# Patient Record
Sex: MALE | Race: AMERICAN INDIAN OR ALASKA NATIVE
[De-identification: names, ages, dates, MRNs, and addresses within clinical notes are randomized per-mention and may not be internally consistent; named-entity substitution may affect disease eponyms.]

---

## 2017-06-05 ENCOUNTER — HOSPITAL ENCOUNTER (OUTPATIENT)
Dept: HOSPITAL 5 - OPU | Age: 42
Discharge: HOME | End: 2017-06-05
Attending: RADIOLOGY
Payer: MEDICARE

## 2017-06-05 VITALS — SYSTOLIC BLOOD PRESSURE: 142 MMHG | DIASTOLIC BLOOD PRESSURE: 94 MMHG

## 2017-06-05 DIAGNOSIS — I12.0: ICD-10-CM

## 2017-06-05 DIAGNOSIS — N18.6: ICD-10-CM

## 2017-06-05 DIAGNOSIS — Z99.2: ICD-10-CM

## 2017-06-05 DIAGNOSIS — Z98.890: ICD-10-CM

## 2017-06-05 DIAGNOSIS — Z88.1: ICD-10-CM

## 2017-06-05 DIAGNOSIS — Z79.899: ICD-10-CM
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
## 2017-06-05 DIAGNOSIS — T82.590A: Primary | ICD-10-CM

## 2017-06-05 PROCEDURE — C1894 INTRO/SHEATH, NON-LASER: HCPCS

## 2017-06-05 PROCEDURE — C1769 GUIDE WIRE: HCPCS

## 2017-06-05 PROCEDURE — 36901 INTRO CATH DIALYSIS CIRCUIT: CPT

## 2017-06-05 PROCEDURE — 36415 COLL VENOUS BLD VENIPUNCTURE: CPT

## 2017-06-05 PROCEDURE — 84132 ASSAY OF SERUM POTASSIUM: CPT

## 2017-06-05 PROCEDURE — C1751 CATH, INF, PER/CENT/MIDLINE: HCPCS

## 2017-06-05 NOTE — OPERATIVE REPORT
Operative Report


Operative Report: 





EXAM: LEFT UPPER EXTREMITY FISTULOGRAM





CLINICAL INDICATION: LEFT ARM AND FACIAL SWELLING





DATE: 6/5/2017





PROCEDURE: Following an excellent addition of the risks, benefits and 

alternatives; written informed consent was obtained.  The patient was brought 

to the injury graphic suite and placed in supine position on the examination 

table.  A palpable thrill is present in the patient's left upper extremity 

fistula.  There is diffuse swelling of the left upper extremity.  Patient's 

left arm and fistula were prepped and draped in the usual sterile fashion.  1% 

lidocaine was used for anesthesia.





A 7 cm 21-gauge needle was advanced to the fistula directed towards the venous 

outflow.  A 0.018 guidewire was advanced through the needle and the needle 

removed and exchanged for a micro-sheath.  The 0.018 guidewire was exchanged 

for a 0.035 guidewire and the micro-sheath exchanged for a 6 Macedonian vascular 

sheath.





Venography performed to the sheath demonstrates an enlarged fistula an area the 

cannulation site with the presence of extensive collateral veins in the upper 

arm.  There is complete occlusion of the subclavian vein with only collateral 

flow distally.  No filling of the right cephalic vein or SVC is identified from 

the left side even on delayed imaging.





A 4 Macedonian vertebral catheter was then advanced over the guidewire to the area 

of the occlusion.  Multiple attempts were made to cannulate the occluded 

subclavian vein.  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 

brachiocephalic vein.  Ultimately, the subclavian vein remains occluded and the 

catheters, guidewires and she's were removed and hemostasis achieved using 4-0 

Vicryl suture and manual compression.  A sterile dressing was then applied.





The patient tolerated the procedure well.  There were no immediate post 

procedure competitions.





Conscious sedation was performed under the guidance of radiologic nursing.  

Continuous cardiopulmonary monitoring was utilized.





IMPRESSION: 1) Left upper extremity fistulogram demonstrating complete 

occlusion of the left subclavian vein.  The patient has known occlusion of the 

left brachial cephalic vein and this appears to be an extension of that 

process.  Patient has poorly developed collaterals leading to left upper 

extremity swelling.  He will need further evaluation for possible fistula 

ligation in fistula creation in the right arm.

## 2017-06-05 NOTE — SHORT STAY SUMMARY
Short Stay Documentation


Date of service: 06/05/17





- History


Principal diagnosis: LUE swelling


H&P: obtained from office





- Allergies and Medications


Current Medications: 


 Allergies





cefazolin sodium [From [facility]] Allergy (Verified 03/31/17 08:06)


 Vomiting





 Home Medications











 Medication  Instructions  Recorded  Confirmed  Last Taken  Type


 


Calcium Acetate 667 mg PO TID 02/20/14 03/31/17 1 Day Ago History








Active Medications





Sodium Chloride (Nacl 0.9% 1000 Ml)  1,000 mls @ 42 mls/hr IV AS DIRECT MYRNA











- Brief post op/procedure progress note


Date of procedure: 06/05/17


Pre-op diagnosis: malfunctioning dialysis access


Post-op diagnosis: same


Procedure: 





LUE FGA


Anesthesia: local


Surgeon: CATHY NEAL


Estimated blood loss: minimal


Pathology: none


Condition: stable





- Disposition


Condition at discharge: Good


Disposition: DISCHARGED TO HOME OR SELFCARE





Short Stay Discharge Plan


Activity: advance as tolerated


Weight Bearing Status: Weight Bear as Tolerated


Diet: renal


Wound: keep clean and dry, per your surgeon's advice


Follow up with: 


URIEL MURPHY MD [Primary Care Provider] - 7 Days


REGIS GIBBONS MD [Staff Physician] - 7 Days

## 2017-07-20 ENCOUNTER — HOSPITAL ENCOUNTER (OUTPATIENT)
Dept: HOSPITAL 5 - OR | Age: 42
Discharge: HOME | End: 2017-07-20
Attending: SURGERY
Payer: MEDICARE

## 2017-07-20 VITALS — DIASTOLIC BLOOD PRESSURE: 74 MMHG | SYSTOLIC BLOOD PRESSURE: 156 MMHG

## 2017-07-20 DIAGNOSIS — Z88.1: ICD-10-CM

## 2017-07-20 DIAGNOSIS — N18.6: ICD-10-CM

## 2017-07-20 DIAGNOSIS — Z99.2: ICD-10-CM

## 2017-07-20 DIAGNOSIS — I12.0: Primary | ICD-10-CM

## 2017-07-20 DIAGNOSIS — Z98.890: ICD-10-CM

## 2017-07-20 DIAGNOSIS — D64.9: ICD-10-CM

## 2017-07-20 DIAGNOSIS — Z87.891: ICD-10-CM
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
## 2017-07-20 DIAGNOSIS — Z79.899: ICD-10-CM

## 2017-07-20 LAB
ANION GAP SERPL CALC-SCNC: 21 MMOL/L
BLASTOCYTES % (MANUAL): 0 %
BUN SERPL-MCNC: 55 MG/DL (ref 9–20)
BUN/CREAT SERPL: 3.84 %
CALCIUM SERPL-MCNC: 8 MG/DL (ref 8.4–10.2)
CHLORIDE SERPL-SCNC: 96.9 MMOL/L (ref 98–107)
CO2 SERPL-SCNC: 26 MMOL/L (ref 22–30)
GLUCOSE SERPL-MCNC: 75 MG/DL (ref 75–100)
HCT VFR BLD CALC: 35.2 % (ref 35.5–45.6)
HGB BLD-MCNC: 11.1 GM/DL (ref 11.8–15.2)
MCH RBC QN AUTO: 28 PG (ref 28–32)
MCHC RBC AUTO-ENTMCNC: 32 % (ref 32–34)
MCV RBC AUTO: 89 FL (ref 84–94)
PLATELET # BLD: 135 K/MM3 (ref 140–440)
POIKILOCYTOSIS BLD QL SMEAR: (no result)
POTASSIUM SERPL-SCNC: 5.3 MMOL/L (ref 3.6–5)
RBC # BLD AUTO: 3.96 M/MM3 (ref 3.65–5.03)
SODIUM SERPL-SCNC: 139 MMOL/L (ref 137–145)
TOTAL CELLS COUNTED PERCENT: 11
WBC # BLD AUTO: 2.6 K/MM3 (ref 4.5–11)

## 2017-07-20 PROCEDURE — 36415 COLL VENOUS BLD VENIPUNCTURE: CPT

## 2017-07-20 PROCEDURE — 85025 COMPLETE CBC W/AUTO DIFF WBC: CPT

## 2017-07-20 PROCEDURE — 80048 BASIC METABOLIC PNL TOTAL CA: CPT

## 2017-07-20 PROCEDURE — 36819 AV FUSE UPPR ARM BASILIC: CPT

## 2017-07-20 PROCEDURE — A4649 SURGICAL SUPPLIES: HCPCS

## 2017-07-20 PROCEDURE — 85007 BL SMEAR W/DIFF WBC COUNT: CPT

## 2017-07-20 PROCEDURE — C1757 CATH, THROMBECTOMY/EMBOLECT: HCPCS

## 2017-07-20 NOTE — SHORT STAY SUMMARY
Short Stay Documentation


Date of service: 07/20/17





- History


H&P: obtained from office





- Allergies and Medications


Current Medications: 


 Allergies





cefazolin sodium [From [facility]] Allergy (Verified 03/31/17 08:06)


 Vomiting





 Home Medications











 Medication  Instructions  Recorded  Confirmed  Last Taken  Type


 


Calcium Acetate 667 mg PO TID 02/20/14 07/20/17 07/19/17 History


 


HYDROcodone/APAP 5-325 [[place] 1 each PO Q4HR PRN #40 tablet 07/20/17  Unknown Rx





5/325]     








Active Medications





Famotidine (Pepcid)  20 mg PO PREOP NR


   Stop: 07/20/17 15:00


   Last Admin: 07/20/17 09:53 Dose:  20 mg


Hydromorphone HCl (Dilaudid)  0.5 mg IV Q10MIN PRN


   PRN Reason: Pain , Severe (7-10)


   Stop: 07/20/17 15:00


Sodium Chloride (Nacl 0.9% 1000 Ml)  1,000 mls @ 42 mls/hr IV AS DIRECT MYRNA


   Last Admin: 07/20/17 09:45 Dose:  42 mls/hr


Vancomycin HCl (Vancomycin/Ns 1 Gm/250 Ml)  1 gm in 250 mls @ 166.667 mls/hr IV 

PREOP NR


   PRN Reason: Protocol


   Stop: 07/20/17 23:59


   Last Admin: 07/20/17 09:55 Dose:  166.667 mls/hr


Midazolam HCl (Versed)  2 mg IV PREOP NR


   Stop: 07/20/17 23:59


   Last Admin: 07/20/17 09:53 Dose:  2 mg











- Brief post op/procedure progress note


Procedure: 








Operative Report


Operative Report: 





Date of procedure: 07/20/2017





Pre-operative diagnosis: End-stage renal failure





Post-operative diagnosis: Same





Procedure name(s): Basalic vein to brachial artery fistula at the level of the 

elbow in the right upper extremity by forearm transposition





Surgeon: Willy Santos MD





Assistant: [EWA Narayanan]





Anesthesia: Local MAC





EBL: Less than 50 mL





Operative indication: Patient is a 41 year old male with a history of end-stage 

renal failure.  The patient presents for establishment of long-term 

hemodialysis access.





Findings: Excellent thrill in the arteriovenous fistula at the end of the 

procedure.  Easily palpable radial pulse distal to the anastomosis.








- Disposition


Condition at discharge: Good


Disposition: DC-01 TO HOME OR SELFCARE





- Discharge Diagnoses


(1) Malfunction of arteriovenous dialysis fistula


Status: Acute   


Qualifiers: 


   Encounter type: E 





(2) ESRD on hemodialysis


Status: Acute   





Short Stay Discharge Plan


Activity: advance as tolerated


Weight Bearing Status: Weight Bear as Tolerated


Diet: renal


Wound: keep clean and dry


Follow up with: 


WILLY SANTOS MD [Staff Physician] - 7 Days


Prescriptions: 


HYDROcodone/APAP 5-325 [Manderson 5/325] 1 each PO Q4HR PRN #40 tablet


 PRN Reason: Pain

## 2017-07-20 NOTE — OPERATIVE REPORT
Operative Report


Operative Report: 





Date of procedure: 07/20/2017





Pre-operative diagnosis: End-stage renal failure





Post-operative diagnosis: Same





Procedure name(s): Basalic vein to brachial artery fistula at the level of the 

elbow in the right upper extremity by forearm transposition





Surgeon: Abelardo Davila MD





Assistant: [EWA Narayanan]





Anesthesia: Local MAC





EBL: Less than 50 mL





Operative indication: Patient is a 41 year old male with a history of end-stage 

renal failure.  The patient presents for establishment of long-term 

hemodialysis access.





Findings: Excellent thrill in the arteriovenous fistula at the end of the 

procedure.  Easily palpable radial pulse distal to the anastomosis.





Procedure: 





The patient was placed on the table in the supine position.  The right arm was 

prepped with ChloraPrep solution and draped in the usual sterile fashion.  The 

ultrasound was used to identify the basilic vein after a tourniquet had been 

placed in the upper arm using a rubber glove.  The vein appeared to be of 

adequate caliber and no thrombus was noted.  The cephalic vein at the wrist 

contained chronic thrombus.  The cephalic vein in the upper arm had been 

previously used for a fistula and there appeared to be a stent in the vein on 

ultrasound.  The vein was completely occluded.  Overall, it appeared that the 

upper arm basilic vein was the best vein to use.





Under local anesthesia, an incision was made across the elbow medially along 

the course of the basilic vein.  Dissection was carried out to identify the 

basilic vein.  A small, separate incision was made in the antecubital fossa 

just above the previous incision site to identify the brachial artery.  This 

was surrounded with Vesseloops proximally and distally just proximal to the 

previous vein anastomosis.. The vein was dissected from the surrounding tissue 

to provide enough length to reach the artery.  A small subcutaneous tunnel was 

then made between the 2 incisions.  The vein was divided distally and then 

spatulated to create a T-type anastomosis with the side of the artery.  An 

arteriotomy was made which was about 5 mm in length.  70 proline was used to 

anastomose the vein to the artery.  All vessels were flushed and vented to 

remove any air or debris.  There was excellent arterial inflow.  The 

anastomosis was completed and flow started into the fistula with the 

development of a thrill along the distal third of the upper arm.  There was 

some angulation of the vein near the anastomosis due to the tendon in the area 

so I incised this partially to relax the transition into the deeper part of the 

arm where the artery was.  The thrill improved even more after that.





Hemostasis was obtained.  The wounds were infiltrated with half percent 

Marcaine and epinephrine.  Closure was done with 3-0 Vicryl and 4-0 

subcuticular PDS.  Dermabond was placed. 





The patient tolerated the procedure well.  There was an easily palpable right 

radial pulse at the end of the procedure.  There was a good thrill in the 

fistula at the end of the procedure.  Sponge, needle, and instrument counts 

were reported as correct.

## 2017-07-20 NOTE — ANESTHESIA CONSULTATION
Anesthesia Consult and Med Hx


Date of service: 07/27/17





- Airway


Anesthetic Teeth Evaluation: Good


ROM Head & Neck: Adequate


Mental/Hyoid Distance: Adequate


Mallampati Class: Class II


Intubation Access Assessment: Probably Good





- Pulmonary Exam


CTA: Yes





- Cardiac Exam


Cardiac Exam: RRR





- Pre-Operative Health Status


ASA Pre-Surgery Classification: ASA3


Proposed Anesthetic Plan: General





- Pulmonary


Hx Smoking: Yes (QUIT 2 MONTHS AGO, SMOKED 1/2 PPD X 6 YRS)


Hx Asthma: No


COPD: No


Hx Pneumonia: No


Hx Sleep Apnea: No





- Cardiovascular System


Hx Hypertension: No





- Central Nervous System


Hx Seizures: No


CVA: No


Hx Psychiatric Problems: No





- Endocrine


Hx Renal Disease: Yes (dialysis M-W-F, had HD yesterday)


Hx End Stage Renal Disease: Yes (2001 started dialysis - LUE fistula 

functioning but arm swollen)


Hx Cirrhosis: No


Hx Insulin Dependent Diabetes: No


Hx Non-Insulin Dependent Diabetes: No


Hx Thyroid Disease: No (s/p parathyroidectomy)





- Hematic


Hx Anemia: Yes





- Other Systems


Hx Alcohol Use: No


Hx Cancer: No


Hx Obesity: No

## 2017-10-12 ENCOUNTER — HOSPITAL ENCOUNTER (OUTPATIENT)
Dept: HOSPITAL 5 - OR | Age: 42
Discharge: HOME | End: 2017-10-12
Attending: SURGERY
Payer: MEDICARE

## 2017-10-12 VITALS — DIASTOLIC BLOOD PRESSURE: 60 MMHG | SYSTOLIC BLOOD PRESSURE: 168 MMHG

## 2017-10-12 DIAGNOSIS — T82.898A: Primary | ICD-10-CM

## 2017-10-12 DIAGNOSIS — I12.0: ICD-10-CM

## 2017-10-12 DIAGNOSIS — Z79.01: ICD-10-CM

## 2017-10-12 DIAGNOSIS — N18.6: ICD-10-CM
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
## 2017-10-12 DIAGNOSIS — Z87.891: ICD-10-CM

## 2017-10-12 DIAGNOSIS — Z99.2: ICD-10-CM

## 2017-10-12 DIAGNOSIS — E89.0: ICD-10-CM

## 2017-10-12 LAB
ANION GAP SERPL CALC-SCNC: 22 MMOL/L
BASOPHILS NFR BLD AUTO: 0.8 % (ref 0–1.8)
BUN SERPL-MCNC: 40 MG/DL (ref 9–20)
BUN/CREAT SERPL: 3 %
CALCIUM SERPL-MCNC: 8.4 MG/DL (ref 8.4–10.2)
CHLORIDE SERPL-SCNC: 93.9 MMOL/L (ref 98–107)
CO2 SERPL-SCNC: 27 MMOL/L (ref 22–30)
EOSINOPHIL NFR BLD AUTO: 1.1 % (ref 0–4.3)
GLUCOSE SERPL-MCNC: 76 MG/DL (ref 75–100)
HCT VFR BLD CALC: 32.6 % (ref 35.5–45.6)
HGB BLD-MCNC: 10.4 GM/DL (ref 11.8–15.2)
INR PPP: 1.07 (ref 0.87–1.13)
MCH RBC QN AUTO: 28 PG (ref 28–32)
MCHC RBC AUTO-ENTMCNC: 32 % (ref 32–34)
MCV RBC AUTO: 87 FL (ref 84–94)
PLATELET # BLD: 160 K/MM3 (ref 140–440)
POTASSIUM SERPL-SCNC: 4.4 MMOL/L (ref 3.6–5)
RBC # BLD AUTO: 3.75 M/MM3 (ref 3.65–5.03)
SODIUM SERPL-SCNC: 138 MMOL/L (ref 137–145)
WBC # BLD AUTO: 3.4 K/MM3 (ref 4.5–11)

## 2017-10-12 PROCEDURE — 80048 BASIC METABOLIC PNL TOTAL CA: CPT

## 2017-10-12 PROCEDURE — 85610 PROTHROMBIN TIME: CPT

## 2017-10-12 PROCEDURE — A4649 SURGICAL SUPPLIES: HCPCS

## 2017-10-12 PROCEDURE — 85025 COMPLETE CBC W/AUTO DIFF WBC: CPT

## 2017-10-12 PROCEDURE — C9250 ARTISS FIBRIN SEALANT: HCPCS

## 2017-10-12 PROCEDURE — 36415 COLL VENOUS BLD VENIPUNCTURE: CPT

## 2017-10-12 PROCEDURE — 36819 AV FUSE UPPR ARM BASILIC: CPT

## 2017-10-12 RX ADMIN — HYDROMORPHONE HYDROCHLORIDE PRN MG: 1 INJECTION, SOLUTION INTRAMUSCULAR; INTRAVENOUS; SUBCUTANEOUS at 11:40

## 2017-10-12 RX ADMIN — HYDROMORPHONE HYDROCHLORIDE PRN MG: 1 INJECTION, SOLUTION INTRAMUSCULAR; INTRAVENOUS; SUBCUTANEOUS at 11:50

## 2017-10-12 NOTE — SHORT STAY SUMMARY
Short Stay Documentation





- History


H&P: obtained from office





- Allergies and Medications


Current Medications: 


 Allergies





cefazolin sodium [From [facility]] Allergy (Verified 10/10/17 15:58)


 Vomiting





 Home Medications











 Medication  Instructions  Recorded  Confirmed  Last Taken  Type


 


Calcium Acetate 667 mg PO TID 02/20/14 10/12/17 10/11/17 History








Active Medications





Sodium Chloride (Nacl 0.9% 1000 Ml)  1,000 mls @ 42 mls/hr IV AS DIRECT MYRNA


   Last Admin: 10/12/17 07:20 Dose:  42 mls/hr


Vancomycin HCl (Vancomycin/Ns 1 Gm/250 Ml)  1 gm in 250 mls @ 166.667 mls/hr IV 

PREOP NR


   PRN Reason: Protocol


   Stop: 10/12/17 23:59


   Last Admin: 10/12/17 07:30 Dose:  166.667 mls/hr











- Brief post op/procedure progress note


Date of procedure: 10/12/17


Pre-op diagnosis: FAILING RUE AVF


Post-op diagnosis: same


Procedure: 





ELEVATION OF RIGHT UPPER ARM BASILIC VEIN FISTULA


Anesthesia: other (LMA, MARCAINE LOCAL)


Findings: 





GOOD THRILL IN AVF AT THE END OF THE PROCEDURE


Surgeon: WILLY SANTOS


Estimated blood loss: 50-100ml


Pathology: none


Condition: stable





- Hospital course


Hospital course: 





[facility]





- Disposition


Condition at discharge: Good


Disposition: DC-01 TO HOME OR SELFCARE





Short Stay Discharge Plan


Activity: advance as tolerated


Diet: advance as tolerated


Wound: per your surgeon's advice


Follow up with: 


WILLY SANTOS MD [Staff Physician] - 7 Days


Prescriptions: 


HYDROcodone/APAP 5-325 [[place] 5/325] 1 each PO Q6HR PRN #40 tablet


 PRN Reason: Pain

## 2017-10-12 NOTE — ANESTHESIA CONSULTATION
Anesthesia Consult and Med Hx


Date of service: 10/12/17





- Airway


Anesthetic Teeth Evaluation: Good


ROM Head & Neck: Adequate


Mental/Hyoid Distance: Adequate


Mallampati Class: Class II


Intubation Access Assessment: Probably Good





- Pulmonary Exam


CTA: Yes





- Cardiac Exam


Cardiac Exam: RRR





- Pre-Operative Health Status


ASA Pre-Surgery Classification: ASA4


Proposed Anesthetic Plan: General





- Pulmonary


Hx Smoking: Yes (former)


Hx Asthma: No


COPD: No


Hx Pneumonia: No


Hx Sleep Apnea: No





- Cardiovascular System


Hx Hypertension: Yes (per pt resolved)





- Central Nervous System


Hx Psychiatric Problems: No





- Endocrine


Hx Renal Disease: Yes (dialysis M-W-F, had HD yesterday)


Hx End Stage Renal Disease: Yes


Hx Cirrhosis: No


Hx Insulin Dependent Diabetes: No


Hx Non-Insulin Dependent Diabetes: No


Hx Thyroid Disease: No (s/p parathyroidectomy)





- Hematic


Hx Anemia: Yes





- Other Systems


Hx Cancer: No


Hx Obesity: No

## 2017-10-12 NOTE — OPERATIVE REPORT
Operative Report


Operative Report: 








Date of procedure: 11/12/2017





Pre-operative diagnosis: Failing arteriovenous fistula right upper extremity





Post-operative diagnosis: Same





Procedure name(s): Creation of right upper extremity arteriovenous fistula by 

upper arm basilic vein transposition





Surgeon: Abelardo Davila MD





Assistant: None





Anesthesia: Gen. with local supplementation using half percent Marcaine plain 

with epinephrine





EBL: Less than 150 mL





Operative indication: Patient is a 41-year-old man with end-stage renal 

failure.  There is a maturing basilic vein fistula in the right upper 

extremity.  This requires elevation for use.





Findings:  Excellent thrill in the fistula the end of the procedure.  Easily 

palpable right radial pulse at the end of the procedure.





Procedure: 


The patient was placed on the table in the supine position.  General anesthesia 

was given.  The area over the right arm was prepped with ChloraPrep solution 

and draped in the usual sterile fashion.  Ultrasound guidance was used to 

identify the location of the basilic vein.  The area around the incision site 

was infiltrated with half percent Marcaine with epinephrine.  A linear incision 

was made along the medial portion of the upper arm over the basilic vein.  

Dissection was carried out to identify the vein.  All nerve structures were 

spared.  There was dense scar tissue around the fistula at the area of the 

elbow which was quite difficult to dissected.  All visible nerve structures 

were spared in this area including a nerve structure which was scarred heavily 

to the body of the fistula.  All branches of the fistula were taken down with 

clips and 3-0 silk ties.  A subcutaneous tunnel was made on the anterior 

surface of the upper arm.  The tunnel was infiltrated with half percent 

Marcaine with epinephrine. Patient was heparinized with 2000 units of 

intravenous heparin.  The vein was occluded near the arterial anastomosis.  The 

vein was divided and hydrodilated with heparinized saline.  The vein was then 

tunneled through the subcutaneous tunnel and redilated with heparinized saline 

to prevent any kinks or twisting in the fistula.  An end to end anastomosis was 

created in a beveled fashion between the 2 ends of the vein at the arterial 

side of the fistula using 60 proline.  The fistula was flushed and vented to 

remove any air or debris and then flow was started into the fistula with the 

development of an immediate and excellent thrill over the entire body of the 

fistula in the upper arm.  Meticulous hemostasis was obtained.  Closure was 

done with 3-0 Vicryl on the subcutaneous tissue.  4-0 subcuticular PDS was used 

to close the skin.  Sterile dressings were applied. Sponge, needle, and 

instrument counts were reported as correct.  The patient tolerated the 

procedure well and left the operating room with an easily palpable right radial 

pulse and an easily palpable thrill in the arteriovenous fistula.

## 2018-05-10 ENCOUNTER — HOSPITAL ENCOUNTER (OUTPATIENT)
Dept: HOSPITAL 5 - OR | Age: 43
Discharge: HOME | End: 2018-05-10
Attending: SURGERY
Payer: MEDICARE

## 2018-05-10 VITALS — DIASTOLIC BLOOD PRESSURE: 78 MMHG | SYSTOLIC BLOOD PRESSURE: 151 MMHG

## 2018-05-10 DIAGNOSIS — I12.0: ICD-10-CM
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
## 2018-05-10 DIAGNOSIS — Z87.891: ICD-10-CM

## 2018-05-10 DIAGNOSIS — Z98.890: ICD-10-CM

## 2018-05-10 DIAGNOSIS — T82.898A: Primary | ICD-10-CM

## 2018-05-10 DIAGNOSIS — I87.309: ICD-10-CM

## 2018-05-10 DIAGNOSIS — N18.6: ICD-10-CM

## 2018-05-10 DIAGNOSIS — Z88.1: ICD-10-CM

## 2018-05-10 LAB
ANISOCYTOSIS BLD QL SMEAR: (no result)
BAND NEUTROPHILS # (MANUAL): 0 K/MM3
BUN SERPL-MCNC: 37 MG/DL (ref 9–20)
BUN/CREAT SERPL: 4 %
CALCIUM SERPL-MCNC: 7.8 MG/DL (ref 8.4–10.2)
HCT VFR BLD CALC: 32.3 % (ref 35.5–45.6)
HEMOLYSIS INDEX: 2
HGB BLD-MCNC: 10.5 GM/DL (ref 11.8–15.2)
MCH RBC QN AUTO: 28 PG (ref 28–32)
MCHC RBC AUTO-ENTMCNC: 33 % (ref 32–34)
MCV RBC AUTO: 87 FL (ref 84–94)
MYELOCYTES # (MANUAL): 0 K/MM3
PLATELET # BLD: 148 K/MM3 (ref 140–440)
PROMYELOCYTES # (MANUAL): 0 K/MM3
RBC # BLD AUTO: 3.7 M/MM3 (ref 3.65–5.03)
TOTAL CELLS COUNTED BLD: 100

## 2018-05-10 PROCEDURE — 36832 AV FISTULA REVISION OPEN: CPT

## 2018-05-10 PROCEDURE — 80048 BASIC METABOLIC PNL TOTAL CA: CPT

## 2018-05-10 PROCEDURE — 36415 COLL VENOUS BLD VENIPUNCTURE: CPT

## 2018-05-10 PROCEDURE — 85025 COMPLETE CBC W/AUTO DIFF WBC: CPT

## 2018-05-10 PROCEDURE — 85007 BL SMEAR W/DIFF WBC COUNT: CPT

## 2018-05-10 PROCEDURE — 88304 TISSUE EXAM BY PATHOLOGIST: CPT

## 2018-05-10 PROCEDURE — 88311 DECALCIFY TISSUE: CPT

## 2018-05-10 NOTE — ANESTHESIA CONSULTATION
Anesthesia Consult and Med Hx





- Airway


Anesthetic Teeth Evaluation: Good


ROM Head & Neck: Adequate


Mental/Hyoid Distance: Adequate


Mallampati Class: Class II


Intubation Access Assessment: Good





- Pulmonary Exam


CTA: Yes





- Cardiac Exam


Cardiac Exam: RRR





- Pre-Operative Health Status


ASA Pre-Surgery Classification: ASA3


Proposed Anesthetic Plan: General





- Pulmonary


Hx Smoking: Yes (former)





- Cardiovascular System


Hx Hypertension: Yes (per pt resolved)





- Central Nervous System


Hx Psychiatric Problems: No





- Endocrine


Hx Renal Disease: Yes


Hx End Stage Renal Disease: Yes


Hx Insulin Dependent Diabetes: No


Hx Non-Insulin Dependent Diabetes: No


Hx Thyroid Disease: No (s/p parathyroidectomy)





- Hematic


Hx Anemia: Yes





- Other Systems


Hx Cancer: No

## 2018-05-10 NOTE — OPERATIVE REPORT
Operative Report


Operative Report: 





Date of procedure: 05/10/2018





Pre-operative diagnosis: Complications of hemodialysis access left arm (

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 

stenosis) 





Post-operative diagnosis: Same





Procedure name(s): Excision of ulcerated AV fistula left arm, repair of 

brachial artery with vein





Surgeon: Zachariah Sadler MD





Assistant: None





Anesthesia: Gen.





EBL: 150 mL





Specimen(s): Ulcerated fistula and skin





Complications: None





Findings: Enlarged ulcerated AV fistula successfully excised and flow in 

brachial artery distal to the arterial repair





Procedure: Patient in the supine position with the left arm extended the entire 

extremity was prepped and draped using standard sterile technique.  A 

longitudinal incision was made beginning on the venous side of the 

pseudoaneurysm and extending distally along the fistula to the arterial 

anastomosis at the antecubital fossa.  The skin overlying the fistula was 

excised in elliptical fashion keeping it attached to the fistula.  Dissection 

was then carried deeply until the anterior surface of the fistula was 

identified throughout the entire length and using a combination of sharp and 

blunt dissection the fistula was  from the surrounding soft tissue in 

the arm.  It was a modest amount of venous bleeding secondary to chronic venous 

hypertension.  This is controlled using a variety of techniques including 

suture ligature vessel ties and electrocautery.  Once the fistula was 

sufficiently mobilized in the upper arm I then continued distally until the 

anastomosis was identified and a centimeter or 2 of brachial artery on either 

side of the fistula was dissected from the scar tissue and encircled using 

vessel loops.  The artery was then occluded and the fistula was then divided 

and controlled with vascular clamps.  I left a small amount of of the fistula 

over the opening and then sewed this down as a patch using 2-0 Prolene suture 

in running technique.  Flow was then released back to the hand with the 

development of an excellent radial artery Doppler signal which was Asst. with 

his preoperative evaluation.  Stasis was good.  Fistula was then ligated 

proximally in the arm and then excised and sent for pathological assessment.  

Hemostasis was obtained using electrocautery.  The patient was also given DDAVP 

venously by anesthesia.  The incision was blocked with field block of Marcaine 

0.5% therapy and closed using 3-0 Vicryl subcutaneous.  The skin was 

reapproximated with 0 Monocryl subcuticular and the incision was sealed with 

Dermabond.  A fluff dressing was applied along with Ace wrap light compression.

  Patient was then returned to the supine position extubated and returned to 

the recovery room in stable condition having tolerated the procedure well.  

Sponge and needle counts were correct.

## 2018-05-10 NOTE — SHORT STAY SUMMARY
Short Stay Documentation


Narrative H&P: 





Admitted to the operating suite for outpatient removal of an AV fistula and 

repair of brachial artery





- History


H&P: obtained from office





- Allergies and Medications


Current Medications: 


 Allergies





cefazolin sodium [From [facility]] Allergy (Verified 05/09/18 13:43)


 Vomiting





 Home Medications











 Medication  Instructions  Recorded  Confirmed  Last Taken  Type


 


Calcium Acetate 667 mg PO TID 02/20/14 05/10/18 05/09/18 22:00 History


 


HYDROcodone/APAP 5-325 [[place] 1 each PO Q6HR PRN #40 tablet 10/12/17 05/09/18 

Unknown Rx





5/325]     








Active Medications





Hydromorphone HCl (Dilaudid)  0.5 mg IV Q10MIN PRN


   PRN Reason: Pain , Severe (7-10)


   Stop: 05/10/18 13:00


Sodium Chloride (Nacl 0.9% 1000 Ml)  1,000 mls @ 42 mls/hr IV AS DIRECT MYRNA


   Last Admin: 05/10/18 07:53 Dose:  42 mls/hr


Vancomycin HCl (Vancomycin/Ns 1 Gm/250 Ml)  1 gm in 250 mls @ 166.667 mls/hr IV 

PREOP NR; Protocol


   Stop: 05/10/18 23:59


   Last Admin: 05/10/18 08:12 Dose:  166.667 mls/hr











- Brief post op/procedure progress note


Date of procedure: 05/10/18


Procedure: 





Pre-operative diagnosis: Complications of hemodialysis access left arm (

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 

stenosis) 





Post-operative diagnosis: Same





Procedure name(s): Excision of ulcerated AV fistula left arm, repair of 

brachial artery with vein





Surgeon: Zachariah Sadler MD





Assistant: None





Anesthesia: Gen.





EBL: 150 mL





Specimen(s): Ulcerated fistula and skin





Complications: None





Findings: Enlarged ulcerated AV fistula successfully excised and flow in 

brachial artery distal to the arterial repair





Procedure: Patient in the supine position with the left arm extended the entire 

extremity was prepped and draped using standard sterile technique.  A 

longitudinal incision was made beginning on the venous side of the 

pseudoaneurysm and extending distally along the fistula to the arterial 

anastomosis at the antecubital fossa.  The skin overlying the fistula was 

excised in elliptical fashion keeping it attached to the fistula.  Dissection 

was then carried deeply until the anterior surface of the fistula was 

identified throughout the entire length and using a combination of sharp and 

blunt dissection the fistula was  from the surrounding soft tissue in 

the arm.  It was a modest amount of venous bleeding secondary to chronic venous 

hypertension.  This is controlled using a variety of techniques including 

suture ligature vessel ties and electrocautery.  Once the fistula was 

sufficiently mobilized in the upper arm I then continued distally until the 

anastomosis was identified and a centimeter or 2 of brachial artery on either 

side of the fistula was dissected from the scar tissue and encircled using 

vessel loops.  The artery was then occluded and the fistula was then divided 

and controlled with vascular clamps.  I left a small amount of of the fistula 

over the opening and then sewed this down as a patch using 2-0 Prolene suture 

in running technique.  Flow was then released back to the hand with the 

development of an excellent radial artery Doppler signal which was Asst. with 

his preoperative evaluation.  Stasis was good.  Fistula was then ligated 

proximally in the arm and then excised and sent for pathological assessment.  

Hemostasis was obtained using electrocautery.  The patient was also given DDAVP 

venously by anesthesia.  The incision was blocked with field block of Marcaine 

0.5% therapy and closed using 3-0 Vicryl subcutaneous.  The skin was 

reapproximated with 0 Monocryl subcuticular and the incision was sealed with 

Dermabond.  A fluff dressing was applied along with Ace wrap light compression.

  Patient was then returned to the supine position extubated and returned to 

the recovery room in stable condition having tolerated the procedure well.  

Sponge and needle counts were correct.





- Disposition


Condition at discharge: Stable


Disposition: DC-01 TO HOME OR SELFCARE





- Discharge Diagnoses


(1) ESRD on hemodialysis


Status: Chronic   





(2) Malfunction of arteriovenous dialysis fistula


Status: Chronic   


Qualifiers: 


   Encounter type: subsequent encounter   Qualified Code(s): T82.590D - Other 

mechanical complication of surgically created arteriovenous fistula, subsequent 

encounter   





(3) Superior vena cava syndrome


Status: Chronic   





Short Stay Discharge Plan


Activity: advance as tolerated


Diet: renal


Wound: keep clean and dry


Special Instructions: no heavy lifting


Follow up with: 


URIEL MURPHY MD [Primary Care Provider] - 7 Days


ZACHARIAH SADLER MD [Staff Physician] - 14 Days


Prescriptions: 


HYDROcodone/APAP 5-325 [[place] 5-325 mg TAB] 1 each PO Q6HR PRN #40 tablet


 PRN Reason: Pain

## 2020-12-16 ENCOUNTER — HOSPITAL ENCOUNTER (OUTPATIENT)
Dept: HOSPITAL 5 - ED | Age: 45
Setting detail: OBSERVATION
Discharge: HOME | End: 2020-12-16
Attending: INTERNAL MEDICINE | Admitting: INTERNAL MEDICINE
Payer: MEDICARE

## 2020-12-16 VITALS — DIASTOLIC BLOOD PRESSURE: 78 MMHG | SYSTOLIC BLOOD PRESSURE: 109 MMHG

## 2020-12-16 DIAGNOSIS — Z91.14: ICD-10-CM

## 2020-12-16 DIAGNOSIS — Z98.890: ICD-10-CM

## 2020-12-16 DIAGNOSIS — F17.200: ICD-10-CM

## 2020-12-16 DIAGNOSIS — Z79.4: ICD-10-CM

## 2020-12-16 DIAGNOSIS — Z99.2: ICD-10-CM

## 2020-12-16 DIAGNOSIS — I12.0: Primary | ICD-10-CM

## 2020-12-16 DIAGNOSIS — N18.6: ICD-10-CM

## 2020-12-16 DIAGNOSIS — Z79.899: ICD-10-CM

## 2020-12-16 DIAGNOSIS — E87.5: ICD-10-CM

## 2020-12-16 LAB
ALBUMIN SERPL-MCNC: 3.9 G/DL (ref 3.9–5)
ALT SERPL-CCNC: 7 UNITS/L (ref 7–56)
BASOPHILS # (AUTO): 0 K/MM3 (ref 0–0.1)
BASOPHILS NFR BLD AUTO: 0.5 % (ref 0–1.8)
BUN SERPL-MCNC: 154 MG/DL (ref 9–20)
BUN SERPL-MCNC: 56 MG/DL (ref 9–20)
BUN/CREAT SERPL: 5 %
BUN/CREAT SERPL: 7 %
CALCIUM SERPL-MCNC: 6.5 MG/DL (ref 8.4–10.2)
CALCIUM SERPL-MCNC: 8.6 MG/DL (ref 8.4–10.2)
EOSINOPHIL # BLD AUTO: 0 K/MM3 (ref 0–0.4)
EOSINOPHIL NFR BLD AUTO: 0.6 % (ref 0–4.3)
HCT VFR BLD CALC: 31 % (ref 35.5–45.6)
HEMOLYSIS INDEX: 12
HEMOLYSIS INDEX: 5
HGB BLD-MCNC: 9.9 GM/DL (ref 11.8–15.2)
LYMPHOCYTES # BLD AUTO: 0.5 K/MM3 (ref 1.2–5.4)
LYMPHOCYTES NFR BLD AUTO: 12.4 % (ref 13.4–35)
MCHC RBC AUTO-ENTMCNC: 32 % (ref 32–34)
MCV RBC AUTO: 96 FL (ref 84–94)
MONOCYTES # (AUTO): 0.4 K/MM3 (ref 0–0.8)
MONOCYTES % (AUTO): 10.2 % (ref 0–7.3)
PLATELET # BLD: 147 K/MM3 (ref 140–440)
RBC # BLD AUTO: 3.23 M/MM3 (ref 3.65–5.03)

## 2020-12-16 PROCEDURE — 96376 TX/PRO/DX INJ SAME DRUG ADON: CPT

## 2020-12-16 PROCEDURE — 80048 BASIC METABOLIC PNL TOTAL CA: CPT

## 2020-12-16 PROCEDURE — 85025 COMPLETE CBC W/AUTO DIFF WBC: CPT

## 2020-12-16 PROCEDURE — 80074 ACUTE HEPATITIS PANEL: CPT

## 2020-12-16 PROCEDURE — 96375 TX/PRO/DX INJ NEW DRUG ADDON: CPT

## 2020-12-16 PROCEDURE — 96374 THER/PROPH/DIAG INJ IV PUSH: CPT

## 2020-12-16 PROCEDURE — 99291 CRITICAL CARE FIRST HOUR: CPT

## 2020-12-16 PROCEDURE — G0378 HOSPITAL OBSERVATION PER HR: HCPCS

## 2020-12-16 PROCEDURE — 94640 AIRWAY INHALATION TREATMENT: CPT

## 2020-12-16 PROCEDURE — 36415 COLL VENOUS BLD VENIPUNCTURE: CPT

## 2020-12-16 PROCEDURE — 80053 COMPREHEN METABOLIC PANEL: CPT

## 2020-12-16 NOTE — DISCHARGE SUMMARY
Providers





- Providers


Date of Admission: 


12/16/20 14:12





Date of discharge: 12/16/20


Attending physician: 


SHEILA QUINTERO





                                        





12/16/20 14:10


Consult to Physician [CONS] Routine 


   Comment: 


   Consulting Provider: ANUPAM ALFONSO


   Physician Instructions: 


   Reason For Exam: dialysis, hyperkalemia











Primary care physician: 


PRIMARY CARE MD








Hospitalization


Condition: Serious


Hospital course: 


Chief complaint: 





Missed hemodialysis for 1 week


History of present illness: 


44-year-old -American male with history of hypertension end-stage renal 

disease has missed dialysis for 1 week.  His dialysis center sent him here for 

clearance.  In the emergency room patient was found to have high potassium level

 because of which patient is being admitted for emergency dialysis and 

observation status.  No shortness of breath.  No chest pain.  No nausea no 

vomiting.  No fever or chills.  No exposure to coronavirus.











Assessment and Plan


Advance Directives: Yes (Full code)


VTE prophylaxis?: Chemical


Plan of care discussed with patient/family: Yes





- Patient Problems


(1) Hyperkalemia


Current Visit: Yes   Status: Acute   


Plan to address problem: 


Patient admitted for emergent hemodialysis and low potassium bath


Repeatt potassium level after dialysis--3.7








(2) End-stage renal disease needing dialysis


Current Visit: Yes   Status: Chronic   


Plan to address problem: 


Continue hemodialysis


Nephrology consult appreciated


Emergent hemodialysis now








(3) Hypertension


Current Visit: Yes   Status: Chronic   


Qualifiers: 


   Hypertension type: essential hypertension   Qualified Code(s): I10 - 

Essential (primary) hypertension   


Plan to address problem: 


Continue antihypertensives








(4) noncompliance 


Patient counseled about compliance and follow-up with the dialysis on a regular 

basis


Also follow-up with primary care physician and nephrology





Disposition: DC-01 TO HOME OR SELFCARE


Time spent for discharge: 30 minutes





- Discharge Diagnoses


(1) Hyperkalemia


Status: Acute   





(2) End-stage renal disease needing dialysis


Status: Chronic   





(3) Hypertension


Status: Chronic   


Qualifiers: 


   Hypertension type: essential hypertension   Qualified Code(s): I10 - 

Essential (primary) hypertension   





(4) DVT prophylaxis


Status: Acute   





Core Measure Documentation





- Palliative Care


Palliative Care/ Comfort Measures: Not Applicable





- Core Measures


Any of the following diagnoses?: none





Exam





- Constitutional


Vitals: 


                                        











Temp Pulse Resp BP Pulse Ox


 


 98.2 F   80   18   109/78   100 


 


 12/16/20 20:04  12/16/20 20:04  12/16/20 20:04  12/16/20 20:04  12/16/20 15:00











General appearance: Present: no acute distress, well-nourished





- EENT


Eyes: Present: PERRL


ENT: hearing intact, clear oral mucosa





- Neck


Neck: Present: supple, normal ROM





- Respiratory


Respiratory effort: normal


Respiratory: bilateral: CTA





- Cardiovascular


Heart rate: 78


Rhythm: regular


Heart Sounds: Present: S1 & S2.  Absent: rub, click





- Extremities


Extremities: pulses symmetrical, No edema


Peripheral Pulses: within normal limits





- Abdominal


General gastrointestinal: Present: soft, non-tender, non-distended, normal bowel

 sounds


Male genitourinary: Present: normal





- Integumentary


Integumentary: Present: clear, warm, dry





- Musculoskeletal


Musculoskeletal: gait normal, strength equal bilaterally





- Psychiatric


Psychiatric: appropriate mood/affect, intact judgment & insight





- Neurologic


Neurologic: CNII-XII intact, moves all extremities





Plan


Activity: no restrictions


Follow up with: 


PRIMARY CARE,MD [Primary Care Provider] - 7 Days


ANUPAM ALFONSO MD [Staff Physician] - 7 Days

## 2020-12-16 NOTE — EMERGENCY DEPARTMENT REPORT
HPI





- General


Chief Complaint: Medical Clearance


Time Seen by Provider: 12/16/20 13:25





- HPI


HPI: 





This is a 44-year-old -American male who presents to the emergency 

department to get dialysis.  The patient does have end-stage renal disease on 

hemodialysis on Monday/Wednesday/Friday.  The patient was in [place] from last

Thursday through Monday and last got dialyzed last Wednesday.  His nephrologist 

is Dr. Reddy.  The patient denies any chest pain, shortness of breath, nausea, 

vomiting, fever, edema.  He has dialysis access to the right upper extremity.





ED Past Medical Hx





- Past Medical History


Previous Medical History?: Yes


Hx Hypertension: Yes (per pt resolved)


Hx Renal Disease: Yes





- Surgical History


Past Surgical History?: Yes


Additional Surgical History: left arm AV graft, parathyroid removed





- Social History


Smoking Status: Current Every Day Smoker


Substance Use Type: None





- Medications


Home Medications: 


                                Home Medications











 Medication  Instructions  Recorded  Confirmed  Last Taken  Type


 


Calcium Acetate 667 mg PO TID 02/20/14 05/10/18 05/09/18 22:00 History





    667 mg 


 


HYDROcodone/APAP 5-325 [[place] 1 each PO Q6HR PRN #40 tablet 05/10/18  Unknown Rx





5-325 mg TAB]     














ED Review of Systems


ROS: 


Stated complaint: DIALYSIS


Other details as noted in HPI





Comment: All other systems reviewed and negative


Constitutional: denies: chills, fever


Eyes: denies: eye pain, vision change


ENT: denies: ear pain, throat pain


Respiratory: denies: cough, shortness of breath


Cardiovascular: denies: chest pain, palpitations


Gastrointestinal: denies: abdominal pain, vomiting


Genitourinary: denies: dysuria, discharge


Musculoskeletal: denies: back pain, arthralgia


Skin: denies: rash, lesions


Neurological: denies: headache, weakness





Physical Exam





- Physical Exam


Vital Signs: 


                                   Vital Signs











  12/16/20 12/16/20





  11:39 11:42


 


Temperature  97.8 F


 


Pulse Rate 67 


 


Respiratory 18 





Rate  


 


Blood Pressure 135/83 


 


O2 Sat by Pulse 99 





Oximetry  











Physical Exam: 





GENERAL: The patient is well-developed well-nourished.


HENT: Normocephalic.  Atraumatic.    Patient has moist mucous membranes.


EYES: Extraocular motions are intact.  


NECK: Supple. Trachea is midline.


CHEST/LUNGS: Clear to auscultation.  There is no respiratory distress noted.


HEART/CARDIOVASCULAR: Regular.  There is no tachycardia.  There is no murmur.


ABDOMEN: Abdomen is soft, nontender.  Patient has normal bowel sounds.  


SKIN: Skin is warm and dry. 


NEURO: The patient is awake, alert, and oriented.  The patient is cooperative.  

The patient has no focal neurologic deficits.  Normal speech.


MUSCULOSKELETAL: There is no tenderness or deformity.  There is no limitation 

range of motion.  There is a right upper extremity dialysis fistula that appears

patent.





ED Course


                                   Vital Signs











  12/16/20 12/16/20





  11:39 11:42


 


Temperature  97.8 F


 


Pulse Rate 67 


 


Respiratory 18 





Rate  


 


Blood Pressure 135/83 


 


O2 Sat by Pulse 99 





Oximetry  














- Reevaluation(s)


Reevaluation #1: 





12/16/20 16:17


                                   Lab Results











  12/16/20 12/16/20 Range/Units





  12:19 12:19 


 


WBC  4.1 L   (4.5-11.0)  K/mm3


 


RBC  3.23 L   (3.65-5.03)  M/mm3


 


Hgb  9.9 L   (11.8-15.2)  gm/dl


 


Hct  31.0 L   (35.5-45.6)  %


 


MCV  96 H   (84-94)  fl


 


MCH  31   (28-32)  pg


 


MCHC  32   (32-34)  %


 


RDW  17.6 H   (13.2-15.2)  %


 


Plt Count  147   (140-440)  K/mm3


 


Lymph % (Auto)  12.4 L   (13.4-35.0)  %


 


Mono % (Auto)  10.2 H   (0.0-7.3)  %


 


Eos % (Auto)  0.6   (0.0-4.3)  %


 


Baso % (Auto)  0.5   (0.0-1.8)  %


 


Lymph # (Auto)  0.5 L   (1.2-5.4)  K/mm3


 


Mono # (Auto)  0.4   (0.0-0.8)  K/mm3


 


Eos # (Auto)  0.0   (0.0-0.4)  K/mm3


 


Baso # (Auto)  0.0   (0.0-0.1)  K/mm3


 


Seg Neutrophils %  76.3 H   (40.0-70.0)  %


 


Seg Neutrophils #  3.2   (1.8-7.7)  K/mm3


 


Sodium   138  (137-145)  mmol/L


 


Potassium   6.9 H*  (3.6-5.0)  mmol/L


 


Chloride   97.2 L  ()  mmol/L


 


Carbon Dioxide   13 L  (22-30)  mmol/L


 


Anion Gap   35  mmol/L


 


BUN   154 H  (9-20)  mg/dL


 


Creatinine   23.1 H  (0.8-1.3)  mg/dL


 


Estimated GFR   3  ml/min


 


BUN/Creatinine Ratio   7  %


 


Glucose   109 H  ()  mg/dL


 


Calcium   6.5 L  (8.4-10.2)  mg/dL


 


Total Bilirubin   0.30  (0.1-1.2)  mg/dL


 


AST   9  (5-40)  units/L


 


ALT   7  (7-56)  units/L


 


Alkaline Phosphatase   39  ()  units/L


 


Total Protein   7.4  (6.3-8.2)  g/dL


 


Albumin   3.9  (3.9-5)  g/dL


 


Albumin/Globulin Ratio   1.1  %














- Consultations


Consultation #1: 





12/16/20 12:17


I spoke to the nephrologist on-call for [facility] nephrology, Dr. Richards. 

She will arrange for dialysis for this patient today.





ED Medical Decision Making





- Lab Data


Result diagrams: 


                                 12/16/20 12:19





                                 12/16/20 12:19





- Medical Decision Making





This patient presents to get dialysis after missing his last 2 dialysis sessions

and he has gone about 1 week without dialysis.  He has no complaints of any 

chest pain, shortness of breath and does not appear in any respiratory or acute 

distress.  However, the patient's labs came back showing significant 

hyperkalemia with a potassium level of 6.9.  Patient was given albuterol, 

insulin, glucose, calcium gluconate.  Nephrology was contacted and consulted and

the patient will receive dialysis today.  He has been accepted for admission by 

the hospitalist, Dr. Saenz.


Critical Care Time: Yes


Critical care time in (mins) excluding proc time.: 31


Critical care attestation.: 


If time is entered above; I have spent that time in minutes in the direct care 

of this critically ill patient, excluding procedure time.  Critical care time 

was spent on this patient in doing his initial evaluation, multiple 

reevaluations, ordering and interpretation of labs, treatment of the significant

hyperkalemia, discussion with the nephrology and hospitalist services.





Critical Care Time: 





31 minutes





ED Disposition


Clinical Impression: 


 End-stage renal disease needing dialysis, Hyperkalemia, Missed dialysis





Disposition: DC-09 OP ADMIT IP TO THIS HOSP


Is pt being admited?: Yes


Condition: Serious


Time of Disposition: 14:12

## 2020-12-16 NOTE — EVENT NOTE
Date: 12/16/20


Case discussed with ER physician


Vitals and labs reviewed


Urgent hemodialysis ordered.  


HD nurse notified.





Karrie Richards MD

## 2020-12-16 NOTE — HISTORY AND PHYSICAL REPORT
History of Present Illness


Date of examination: 12/16/20


Date of admission: 


12/16/20 14:12





Chief complaint: 





Missed hemodialysis for 1 week


History of present illness: 


44-year-old -American male with history of hypertension end-stage renal 

disease has missed dialysis for 1 week.  His dialysis center sent him here for 

clearance.  In the emergency room patient was found to have high potassium level

because of which patient is being admitted for emergency dialysis and 

observation status.  No shortness of breath.  No chest pain.  No nausea no 

vomiting.  No fever or chills.  No exposure to coronavirus.





- Past Medical History


Previous Medical History?: Yes


--Hypertension: Yes (per pt resolved)


--Renal Disease: Yes





- Surgical History


Past Surgical History?: Yes


Additional Surgical History: left arm AV graft, parathyroid removed





- Social History  


Smoking Status: Current Every Day Smoker


Substance Use Type: None  





-- Family history


Htn





- Medications


Home Medications: 


                                Home Medications











 Medication  Instructions  Recorded  Confirmed  Last Taken  Type


 


Calcium Acetate 667 mg PO TID 02/20/14 05/10/18 05/09/18 22:00 History





    667 mg 


 


HYDROcodone/APAP 5-325 [[place] 1 each PO Q6HR PRN #40 tablet 05/10/18  Unknown Rx





5-325 mg TAB]     














--Review of Systems


ROS: 


Stated complaint: DIALYSIS


Other details as noted in HPI





Comment: All other systems reviewed and negative


Constitutional: denies: chills, fever


Eyes: denies: eye pain, vision change


ENT: denies: ear pain, throat pain


Respiratory: denies: cough, shortness of breath


Cardiovascular: denies: chest pain, palpitations


Gastrointestinal: denies: abdominal pain, vomiting


Genitourinary: denies: dysuria, discharge


Musculoskeletal: denies: back pain, arthralgia


Skin: denies: rash, lesions


Neurological: denies: headache, weakness








Medications and Allergies


                                    Allergies











Allergy/AdvReac Type Severity Reaction Status Date / Time


 


cefazolin sodium [From Ancef] Allergy  Vomiting Verified 05/09/18 13:43











                                Home Medications











 Medication  Instructions  Recorded  Confirmed  Last Taken  Type


 


Calcium Acetate 667 mg PO TID 02/20/14 05/10/18 05/09/18 22:00 History





    667 mg 


 


HYDROcodone/APAP 5-325 [[place] 1 each PO Q6HR PRN #40 tablet 05/10/18  Unknown Rx





5-325 mg TAB]     











Active Meds: 


Active Medications





Insulin Human Regular (Insulin Regular, Human 100 Unit/Ml 3ml Vial)  5 unit IV 

ONCE MYRNA


   Last Admin: 12/16/20 14:20 Dose:  5 unit


   Documented by: 











Exam





- Constitutional


Vitals: 


                                        











Temp Pulse Resp BP Pulse Ox


 


 98.2 F   80   18   109/78   100 


 


 12/16/20 20:04  12/16/20 20:04  12/16/20 20:04  12/16/20 20:04  12/16/20 15:00











General appearance: Present: no acute distress, well-nourished





- EENT


Eyes: Present: PERRL


ENT: hearing intact, clear oral mucosa





- Neck


Neck: Present: supple, normal ROM





- Respiratory


Respiratory effort: normal


Respiratory: bilateral: CTA





- Cardiovascular


Heart rate: 78


Heart Sounds: Present: S1 & S2.  Absent: rub, click





- Extremities


Extremities: pulses symmetrical, No edema


Peripheral Pulses: within normal limits





- Abdominal


General gastrointestinal: Present: soft, non-tender, non-distended, normal bowel

sounds


Male genitourinary: Present: normal





- Integumentary


Integumentary: Present: clear, warm, dry





- Musculoskeletal


Musculoskeletal: gait normal, strength equal bilaterally





- Psychiatric


Psychiatric: appropriate mood/affect, intact judgment & insight





- Neurologic


Neurologic: CNII-XII intact, moves all extremities





Results





- Labs


CBC & Chem 7: 


                                 12/16/20 12:19





                                 12/16/20 20:20


Labs: 





                                      BMP











  12/16/20 12/16/20





  12:19 20:20


 


Sodium  138  137


 


Potassium  6.9 H*  3.7  D


 


Chloride  97.2 L  92.4 L


 


Carbon Dioxide  13 L  28  D


 


BUN  154 H  56 H


 


Creatinine  23.1 H  11.2 H D


 


Glucose  109 H  72 L


 


Calcium  6.5 L  8.6  D








                                 Liver Function











  12/16/20 Range/Units





  12:19 


 


Total Bilirubin  0.30  (0.1-1.2)  mg/dL


 


AST  9  (5-40)  units/L


 


ALT  7  (7-56)  units/L


 


Alkaline Phosphatase  39  ()  units/L


 


Albumin  3.9  (3.9-5)  g/dL








                                        


                                        


                                        


                             Laboratory Last Values











WBC  4.1 K/mm3 (4.5-11.0)  L  12/16/20  12:19    


 


RBC  3.23 M/mm3 (3.65-5.03)  L  12/16/20  12:19    


 


Hgb  9.9 gm/dl (11.8-15.2)  L  12/16/20  12:19    


 


Hct  31.0 % (35.5-45.6)  L  12/16/20  12:19    


 


MCV  96 fl (84-94)  H  12/16/20  12:19    


 


MCH  31 pg (28-32)   12/16/20  12:19    


 


MCHC  32 % (32-34)   12/16/20  12:19    


 


RDW  17.6 % (13.2-15.2)  H  12/16/20  12:19    


 


Plt Count  147 K/mm3 (140-440)   12/16/20  12:19    


 


Lymph % (Auto)  12.4 % (13.4-35.0)  L  12/16/20  12:19    


 


Mono % (Auto)  10.2 % (0.0-7.3)  H  12/16/20  12:19    


 


Eos % (Auto)  0.6 % (0.0-4.3)   12/16/20  12:19    


 


Baso % (Auto)  0.5 % (0.0-1.8)   12/16/20  12:19    


 


Lymph # (Auto)  0.5 K/mm3 (1.2-5.4)  L  12/16/20  12:19    


 


Mono # (Auto)  0.4 K/mm3 (0.0-0.8)   12/16/20  12:19    


 


Eos # (Auto)  0.0 K/mm3 (0.0-0.4)   12/16/20  12:19    


 


Baso # (Auto)  0.0 K/mm3 (0.0-0.1)   12/16/20  12:19    


 


Seg Neutrophils %  76.3 % (40.0-70.0)  H  12/16/20  12:19    


 


Seg Neutrophils #  3.2 K/mm3 (1.8-7.7)   12/16/20  12:19    


 


Sodium  137 mmol/L (137-145)   12/16/20  20:20    


 


Potassium  3.7 mmol/L (3.6-5.0)  D 12/16/20  20:20    


 


Chloride  92.4 mmol/L ()  L  12/16/20  20:20    


 


Carbon Dioxide  28 mmol/L (22-30)  D 12/16/20  20:20    


 


Anion Gap  20 mmol/L  12/16/20  20:20    


 


BUN  56 mg/dL (9-20)  H  12/16/20  20:20    


 


Creatinine  11.2 mg/dL (0.8-1.3)  H D 12/16/20  20:20    


 


Estimated GFR  6 ml/min  12/16/20  20:20    


 


BUN/Creatinine Ratio  5 %  12/16/20  20:20    


 


Glucose  72 mg/dL ()  L  12/16/20  20:20    


 


Calcium  8.6 mg/dL (8.4-10.2)  D 12/16/20  20:20    


 


Total Bilirubin  0.30 mg/dL (0.1-1.2)   12/16/20  12:19    


 


AST  9 units/L (5-40)   12/16/20  12:19    


 


ALT  7 units/L (7-56)   12/16/20  12:19    


 


Alkaline Phosphatase  39 units/L ()   12/16/20  12:19    


 


Total Protein  7.4 g/dL (6.3-8.2)   12/16/20  12:19    


 


Albumin  3.9 g/dL (3.9-5)   12/16/20  12:19    


 


Albumin/Globulin Ratio  1.1 %  12/16/20  12:19    


 


Hepatitis A IgM Ab  Non-reactive  (NonReactive)   12/16/20  14:31    


 


Hep Bs Antigen  Non-reactive  (Negative)   12/16/20  14:31    


 


Hep B Core IgM Ab  Non-reactive  (NonReactive)   12/16/20  14:31    


 


Hepatitis C Antibody  Non-reactive  (NonReactive)   12/16/20  14:31    








                                    Short CBC











  12/16/20 Range/Units





  12:19 


 


WBC  4.1 L  (4.5-11.0)  K/mm3


 


Hgb  9.9 L  (11.8-15.2)  gm/dl


 


Hct  31.0 L  (35.5-45.6)  %


 


Plt Count  147  (140-440)  K/mm3








                                        








Assessment and Plan


Advance Directives: Yes (Full code)


VTE prophylaxis?: Chemical


Plan of care discussed with patient/family: Yes





- Patient Problems


(1) Hyperkalemia


Current Visit: Yes   Status: Acute   


Plan to address problem: 


Patient admitted for emergent hemodialysis and low potassium bath


Report potassium level after dialysis








(2) End-stage renal disease needing dialysis


Current Visit: Yes   Status: Chronic   


Plan to address problem: 


Continue hemodialysis


Nephrology consult appreciated


Emergent hemodialysis now








(3) Hypertension


Current Visit: Yes   Status: Chronic   


Qualifiers: 


   Hypertension type: essential hypertension   Qualified Code(s): I10 - 

Essential (primary) hypertension   


Plan to address problem: 


Continue antihypertensives








(4) DVT prophylaxis


Current Visit: No   Status: Acute   


Plan to address problem: 


Continue heparin and GI prophylaxis

## 2021-06-15 ENCOUNTER — HOSPITAL ENCOUNTER (EMERGENCY)
Dept: HOSPITAL 5 - ED | Age: 46
End: 2021-06-15
Payer: MEDICARE

## 2021-06-16 ENCOUNTER — HOSPITAL ENCOUNTER (EMERGENCY)
Dept: HOSPITAL 5 - ED | Age: 46
Discharge: LEFT BEFORE DISCHARGE | End: 2021-06-16
Payer: MEDICARE

## 2021-06-16 DIAGNOSIS — Z53.21: ICD-10-CM

## 2021-06-16 DIAGNOSIS — Z00.8: Primary | ICD-10-CM

## 2021-11-30 ENCOUNTER — HOSPITAL ENCOUNTER (OUTPATIENT)
Dept: HOSPITAL 5 - ED | Age: 46
Setting detail: OBSERVATION
Discharge: LEFT BEFORE DISCHARGE | End: 2021-11-30
Attending: INTERNAL MEDICINE | Admitting: INTERNAL MEDICINE
Payer: MEDICARE

## 2021-11-30 VITALS — SYSTOLIC BLOOD PRESSURE: 132 MMHG | DIASTOLIC BLOOD PRESSURE: 86 MMHG

## 2021-11-30 DIAGNOSIS — F17.210: ICD-10-CM

## 2021-11-30 DIAGNOSIS — E87.5: Primary | ICD-10-CM

## 2021-11-30 DIAGNOSIS — Z79.899: ICD-10-CM

## 2021-11-30 DIAGNOSIS — Z99.2: ICD-10-CM

## 2021-11-30 DIAGNOSIS — E87.70: ICD-10-CM

## 2021-11-30 DIAGNOSIS — Z98.890: ICD-10-CM

## 2021-11-30 DIAGNOSIS — N18.6: ICD-10-CM

## 2021-11-30 DIAGNOSIS — I12.0: ICD-10-CM

## 2021-11-30 LAB
BASOPHILS # (AUTO): 0 K/MM3 (ref 0–0.1)
BASOPHILS NFR BLD AUTO: 1.3 % (ref 0–1.8)
BUN SERPL-MCNC: 105 MG/DL (ref 9–20)
BUN/CREAT SERPL: 5 %
CALCIUM SERPL-MCNC: 6.4 MG/DL (ref 8.4–10.2)
EOSINOPHIL # BLD AUTO: 0 K/MM3 (ref 0–0.4)
EOSINOPHIL NFR BLD AUTO: 1.1 % (ref 0–4.3)
HCT VFR BLD CALC: 34.6 % (ref 35.5–45.6)
HEMOLYSIS INDEX: 28
HGB BLD-MCNC: 10.8 GM/DL (ref 11.8–15.2)
LYMPHOCYTES # BLD AUTO: 0.8 K/MM3 (ref 1.2–5.4)
LYMPHOCYTES NFR BLD AUTO: 20.3 % (ref 13.4–35)
MCHC RBC AUTO-ENTMCNC: 31 % (ref 32–34)
MCV RBC AUTO: 94 FL (ref 84–94)
MONOCYTES # (AUTO): 0.5 K/MM3 (ref 0–0.8)
MONOCYTES % (AUTO): 13.4 % (ref 0–7.3)
PLATELET # BLD: 150 K/MM3 (ref 140–440)
RBC # BLD AUTO: 3.68 M/MM3 (ref 3.65–5.03)

## 2021-11-30 PROCEDURE — 96374 THER/PROPH/DIAG INJ IV PUSH: CPT

## 2021-11-30 PROCEDURE — 84132 ASSAY OF SERUM POTASSIUM: CPT

## 2021-11-30 PROCEDURE — 85025 COMPLETE CBC W/AUTO DIFF WBC: CPT

## 2021-11-30 PROCEDURE — 96375 TX/PRO/DX INJ NEW DRUG ADDON: CPT

## 2021-11-30 PROCEDURE — 80074 ACUTE HEPATITIS PANEL: CPT

## 2021-11-30 PROCEDURE — G0378 HOSPITAL OBSERVATION PER HR: HCPCS

## 2021-11-30 PROCEDURE — 99284 EMERGENCY DEPT VISIT MOD MDM: CPT

## 2021-11-30 PROCEDURE — G0257 UNSCHED DIALYSIS ESRD PT HOS: HCPCS

## 2021-11-30 PROCEDURE — 36415 COLL VENOUS BLD VENIPUNCTURE: CPT

## 2021-11-30 PROCEDURE — 80048 BASIC METABOLIC PNL TOTAL CA: CPT

## 2021-11-30 NOTE — EMERGENCY DEPARTMENT REPORT
HPI





- General


Chief Complaint: Medical Clearance


Time Seen by Provider: 11/30/21 11:43





- HPI


HPI: 





45-year-old male presents to the emergency department for medical clearance for 

dialysis.  He has a history of end-stage renal disease on hemodialysis on Mon

day/Wednesday/Friday.  Because of the recent Thanksgiving holidays the patient 

last had dialysis on Tuesday, 11/23, and then missed his scheduled Friday 

dialysis session.  When he went in to get dialysis yesterday he was told he 

would have to come to the emergency department for a medical clearance first.  

He denies having any shortness of breath, chest pain, lower extremity swelling 

or any symptoms at this time.  He follows with Dr. Bowles for nephrology.





ED Past Medical Hx





- Past Medical History


Hx Hypertension: Yes (per pt resolved)


Hx Renal Disease: Yes





- Surgical History


Additional Surgical History: left arm AV graft, parathyroid removed





- Social History


Smoking Status: Current Every Day Smoker


Substance Use Type: None





- Medications


Home Medications: 


                                Home Medications











 Medication  Instructions  Recorded  Confirmed  Last Taken  Type


 


Calcium Acetate 667 mg PO TID 02/20/14 05/10/18 05/09/18 22:00 History





    667 mg 


 


HYDROcodone/APAP 5-325 [[place] 1 each PO Q6HR PRN #40 tablet 05/10/18  Unknown Rx





5-325 mg TAB]     














ED Review of Systems


ROS: 


Stated complaint: NEED DIALYSIS


Other details as noted in HPI





Comment: All other systems reviewed and negative


Constitutional: denies: chills, fever


Eyes: denies: eye pain, vision change


ENT: denies: ear pain, throat pain


Respiratory: denies: cough, shortness of breath


Cardiovascular: denies: chest pain, palpitations


Gastrointestinal: denies: abdominal pain, vomiting


Genitourinary: denies: dysuria, discharge


Musculoskeletal: denies: back pain, arthralgia


Skin: denies: rash, lesions


Neurological: denies: headache, weakness





Physical Exam





- Physical Exam


Vital Signs: 


                                   Vital Signs











  11/30/21





  11:21


 


Temperature 97.9 F


 


Pulse Rate 62


 


Respiratory 16





Rate 


 


Blood Pressure 129/87


 


O2 Sat by Pulse 100





Oximetry 











Physical Exam: 





GENERAL: The patient is well-developed well-nourished.


HENT: Normocephalic.  Atraumatic.    Patient has moist mucous membranes.


EYES: Extraocular motions are intact.  


NECK: Supple. Trachea is midline.


CHEST/LUNGS: Clear to auscultation.  There is no respiratory distress noted.


HEART/CARDIOVASCULAR: Regular.  There is no tachycardia.  There is no murmur.


ABDOMEN: Abdomen is soft, nontender.  Patient has normal bowel sounds. 


SKIN: Skin is warm and dry. 


NEURO: The patient is awake, alert, and oriented.  The patient is cooperative.  

Normal speech.


MUSCULOSKELETAL: There is no tenderness or deformity.  There is no limitation 

range of motion. 





ED Course


                                   Vital Signs











  11/30/21





  11:21


 


Temperature 97.9 F


 


Pulse Rate 62


 


Respiratory 16





Rate 


 


Blood Pressure 129/87


 


O2 Sat by Pulse 100





Oximetry 














- Consultations


Consultation #1: 





11/30/21 13:59


I spoke to SUYAPA Stephenson, working for Dr. Bowles at [facility] 

nephrology.  They have arranged for the patient to get dialysis today but it may

be a few hours until he gets a spot in one of the chairs.  They have recommended

to give the hyperkalemia cocktail minus Kayexalate.





ED Medical Decision Making





- Lab Data


Result diagrams: 


                                 11/30/21 12:19





                                 11/30/21 12:19





                                   Lab Results











  11/30/21 11/30/21 Range/Units





  12:19 12:19 


 


WBC  3.7 L   (4.5-11.0)  K/mm3


 


RBC  3.68   (3.65-5.03)  M/mm3


 


Hgb  10.8 L   (11.8-15.2)  gm/dl


 


Hct  34.6 L   (35.5-45.6)  %


 


MCV  94   (84-94)  fl


 


MCH  30   (28-32)  pg


 


MCHC  31 L   (32-34)  %


 


RDW  16.0 H   (13.2-15.2)  %


 


Plt Count  150   (140-440)  K/mm3


 


Lymph % (Auto)  20.3   (13.4-35.0)  %


 


Mono % (Auto)  13.4 H   (0.0-7.3)  %


 


Eos % (Auto)  1.1   (0.0-4.3)  %


 


Baso % (Auto)  1.3   (0.0-1.8)  %


 


Lymph # (Auto)  0.8 L   (1.2-5.4)  K/mm3


 


Mono # (Auto)  0.5   (0.0-0.8)  K/mm3


 


Eos # (Auto)  0.0   (0.0-0.4)  K/mm3


 


Baso # (Auto)  0.0   (0.0-0.1)  K/mm3


 


Seg Neutrophils %  63.9   (40.0-70.0)  %


 


Seg Neutrophils #  2.4   (1.8-7.7)  K/mm3


 


Sodium   138  (137-145)  mmol/L


 


Potassium   6.5 H*  (3.6-5.0)  mmol/L


 


Chloride   94.4 L  ()  mmol/L


 


Carbon Dioxide   16 L  (22-30)  mmol/L


 


Anion Gap   34  mmol/L


 


BUN   105 H  (9-20)  mg/dL


 


Creatinine   22.9 H  (0.8-1.3)  mg/dL


 


Estimated GFR   3  ml/min


 


BUN/Creatinine Ratio   5  %


 


Glucose   76  ()  mg/dL


 


Calcium   6.4 L  (8.4-10.2)  mg/dL














- Medical Decision Making





This patient presents to the emergency department for a medical clearance to get

dialysis after missing dialysis last Friday and having gotten his last dialysis 

session about 1 week ago.  Otherwise he has no physical complaints.  Vital signs

reassuring including being afebrile.  However his labs show hyperkalemia with a 

potassium level of 6.5.  There is some developing uremia with a BUN of about 105

and his creatinine is 22.  Nephrology was contacted and consulted.  The patient 

was given insulin and glucose, calcium, and albuterol to temporarily improve the

hyperkalemia, and the patient is due to have dialysis in a few hours.  He was 

accepted for admission by the hospitalist, Dr. Saenz.


Critical Care Time: No


Critical care attestation.: 


If time is entered above; I have spent that time in minutes in the direct care 

of this critically ill patient, excluding procedure time.








ED Disposition


Clinical Impression: 


 ESRD needing dialysis, Hyperkalemia, Uremia, Missed dialysis





Disposition: 09 ADMITTED AS INPATIENT


Is pt being admited?: Yes


Condition: Serious


Referrals: 


PRIMARY CARE,MD [Primary Care Provider] - 3-5 Days


Time of Disposition: 13:51

## 2021-11-30 NOTE — HISTORY AND PHYSICAL REPORT
History of Present Illness


Date of examination: 11/30/21


Date of admission: 


11/30/21 13:51





History of present illness: 





45-year-old male presents to the emergency department for medical clearance for 

dialysis.  He has a history of end-stage renal disease on hemodialysis on 

Monday/Wednesday/Friday.  Because of the recent Thanksgiving holidays the 

patient last had dialysis on Tuesday, 11/23, and then missed his scheduled 

Friday dialysis session.  When he went in to get dialysis yesterday he was told 

he would have to come to the emergency department for a medical clearance first.

 He denies having any shortness of breath, chest pain, lower extremity swelling 

or any symptoms at this time.  He follows with Dr. Bowles for nephrology.





ED Past Medical Hx





- Past Medical History


Hx Hypertension: Yes (per pt resolved)


Hx Renal Disease: Yes





- Surgical History


Additional Surgical History: left arm AV graft, parathyroid removed





- Social History


Smoking Status: Current Every Day Smoker


Substance Use Type: None





- Medications


Home Medications: 


                                Home Medications











 Medication  Instructions  Recorded  Confirmed  Last Taken  Type


 


Calcium Acetate 667 mg PO TID 02/20/14 05/10/18 05/09/18 22:00 History





    667 mg 


 


HYDROcodone/APAP 5-325 [[place] 1 each PO Q6HR PRN #40 tablet 05/10/18  Unknown Rx





5-325 mg TAB]     














Review of Systems


ROS: 


Stated complaint: NEED DIALYSIS


Other details as noted in HPI





Comment: All other systems reviewed and negative


Constitutional: denies: chills, fever


Eyes: denies: eye pain, vision change


ENT: denies: ear pain, throat pain


Respiratory: denies: cough, shortness of breath


Cardiovascular: denies: chest pain, palpitations


Gastrointestinal: denies: abdominal pain, vomiting


Genitourinary: denies: dysuria, discharge


Musculoskeletal: denies: back pain, arthralgia


Skin: denies: rash, lesions


Neurological: denies: headache, weakness








Medications and Allergies


                                    Allergies











Allergy/AdvReac Type Severity Reaction Status Date / Time


 


cefazolin sodium [From Ancef] Allergy  Vomiting Verified 05/09/18 13:43











                                Home Medications











 Medication  Instructions  Recorded  Confirmed  Last Taken  Type


 


Calcium Acetate 667 mg PO TID 02/20/14 05/10/18 05/09/18 22:00 History





    667 mg 


 


HYDROcodone/APAP 5-325 [[place] 1 each PO Q6HR PRN #40 tablet 05/10/18  Unknown Rx





5-325 mg TAB]     











Active Meds: 


Active Medications





Calcium Acetate (Calcium Acetate 667 Mg Cap)  667 mg PO TID MYRNA


Sodium Chloride (Nacl 0.9%)  100 mls @ 999 mls/hr IV PARAM PRN


   PRN Reason: Hypotension











Exam





- Constitutional


Vitals: 


                                        











Temp Pulse Resp BP Pulse Ox


 


 97.9 F   70   15   128/80   100 


 


 11/30/21 11:21  11/30/21 16:16  11/30/21 16:16  11/30/21 16:16  11/30/21 16:16











General appearance: Present: no acute distress, well-nourished





- EENT


Eyes: Present: PERRL


ENT: hearing intact, clear oral mucosa





- Neck


Neck: Present: supple, normal ROM





- Respiratory


Respiratory effort: normal


Respiratory: bilateral: CTA





- Cardiovascular


Heart Sounds: Present: S1 & S2.  Absent: rub, click





- Extremities


Extremities: pulses symmetrical, No edema


Peripheral Pulses: within normal limits





- Abdominal


General gastrointestinal: Present: soft, non-tender, non-distended, normal bowel

sounds


Male genitourinary: Present: normal





- Integumentary


Integumentary: Present: clear, warm, dry





- Musculoskeletal


Musculoskeletal: gait normal, strength equal bilaterally





- Psychiatric


Psychiatric: appropriate mood/affect, intact judgment & insight





- Neurologic


Neurologic: CNII-XII intact, moves all extremities





Results





- Labs


CBC & Chem 7: 


                                 11/30/21 12:19





                                 11/30/21 12:19


Labs: 


                             Laboratory Last Values











WBC  3.7 K/mm3 (4.5-11.0)  L  11/30/21  12:19    


 


RBC  3.68 M/mm3 (3.65-5.03)   11/30/21  12:19    


 


Hgb  10.8 gm/dl (11.8-15.2)  L  11/30/21  12:19    


 


Hct  34.6 % (35.5-45.6)  L  11/30/21  12:19    


 


MCV  94 fl (84-94)   11/30/21  12:19    


 


MCH  30 pg (28-32)   11/30/21  12:19    


 


MCHC  31 % (32-34)  L  11/30/21  12:19    


 


RDW  16.0 % (13.2-15.2)  H  11/30/21  12:19    


 


Plt Count  150 K/mm3 (140-440)   11/30/21  12:19    


 


Lymph % (Auto)  20.3 % (13.4-35.0)   11/30/21  12:19    


 


Mono % (Auto)  13.4 % (0.0-7.3)  H  11/30/21  12:19    


 


Eos % (Auto)  1.1 % (0.0-4.3)   11/30/21  12:19    


 


Baso % (Auto)  1.3 % (0.0-1.8)   11/30/21  12:19    


 


Lymph # (Auto)  0.8 K/mm3 (1.2-5.4)  L  11/30/21  12:19    


 


Mono # (Auto)  0.5 K/mm3 (0.0-0.8)   11/30/21  12:19    


 


Eos # (Auto)  0.0 K/mm3 (0.0-0.4)   11/30/21  12:19    


 


Baso # (Auto)  0.0 K/mm3 (0.0-0.1)   11/30/21  12:19    


 


Seg Neutrophils %  63.9 % (40.0-70.0)   11/30/21  12:19    


 


Seg Neutrophils #  2.4 K/mm3 (1.8-7.7)   11/30/21  12:19    


 


Sodium  138 mmol/L (137-145)   11/30/21  12:19    


 


Potassium  6.5 mmol/L (3.6-5.0)  H*  11/30/21  12:19    


 


Chloride  94.4 mmol/L ()  L  11/30/21  12:19    


 


Carbon Dioxide  16 mmol/L (22-30)  L  11/30/21  12:19    


 


Anion Gap  34 mmol/L  11/30/21  12:19    


 


BUN  105 mg/dL (9-20)  H  11/30/21  12:19    


 


Creatinine  22.9 mg/dL (0.8-1.3)  H  11/30/21  12:19    


 


Estimated GFR  3 ml/min  11/30/21  12:19    


 


BUN/Creatinine Ratio  5 %  11/30/21  12:19    


 


Glucose  76 mg/dL ()   11/30/21  12:19    


 


Calcium  6.4 mg/dL (8.4-10.2)  L  11/30/21  12:19    


 


Hepatitis A IgM Ab  Non-reactive  (NonReactive)   11/30/21  14:57    


 


Hep Bs Antigen  Nonreactive  (Negative)   11/30/21  14:57    


 


Hep B Core IgM Ab  Non-reactive  (NonReactive)   11/30/21  14:57    


 


Hepatitis C Antibody  Non-reactive  (NonReactive)   11/30/21  14:57    














Assessment and Plan


Advance Directives: Yes (Full code)


VTE prophylaxis?: Chemical


Plan of care discussed with patient/family: Yes





- Patient Problems


(1) Hyperkalemia


Current Visit: Yes   Status: Acute   





(2) Missed dialysis


Current Visit: Yes   Status: Acute   





(3) End-stage renal disease needing dialysis


Current Visit: Yes   Status: Chronic   





(4) Fluid overload


Current Visit: No   Status: Acute   





(5) Hypertension


Current Visit: No   Status: Chronic

## 2022-06-29 ENCOUNTER — HOSPITAL ENCOUNTER (EMERGENCY)
Dept: HOSPITAL 5 - ED | Age: 47
LOS: 1 days | Discharge: HOME | End: 2022-06-30
Payer: MEDICARE

## 2022-06-29 VITALS — DIASTOLIC BLOOD PRESSURE: 63 MMHG | SYSTOLIC BLOOD PRESSURE: 110 MMHG

## 2022-06-29 DIAGNOSIS — T81.49XA: Primary | ICD-10-CM

## 2022-06-29 DIAGNOSIS — Z88.8: ICD-10-CM

## 2022-06-29 DIAGNOSIS — I10: ICD-10-CM

## 2022-06-29 DIAGNOSIS — F17.200: ICD-10-CM

## 2022-06-29 PROCEDURE — 99283 EMERGENCY DEPT VISIT LOW MDM: CPT

## 2022-06-29 NOTE — XRAY REPORT
Right hand, 3 views



HISTORY: Injury



COMPARISON: None



FINDINGS: There is diffuse nonspecific soft tissue swelling of the right thumb. No soft tissue gas or
 radiopaque foreign body. No aggressive cortical destructive changes. No acute fracture or malalignme
nt. 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



Signer Name: Roni Ji MD 

Signed: 6/29/2022 10:32 PM

Workstation Name: [place] [facility]-

## 2022-06-30 NOTE — EMERGENCY DEPARTMENT REPORT
- General


Chief Complaint: Extremity Injury, Upper


Stated Complaint: RT THUMB WOUND


Time Seen by Provider: 06/30/22 07:53


Source: patient


Mode of arrival: Ambulatory


Limitations: No Limitations





- History of Present Illness


Initial Comments: 





Mr. Rojo is a 46-year-old that comes to the emergency room with a thumb wound.

 He states that he closed his thumb in the door 2 weeks ago.  It looks like he 

had an avulsion wound of the skin of the thumb.  It is red and inflamed.  He was

admitted to the ER about 12 hours ago.  On exam in [facility] he is in no acute 

distress.  He has no fever.  He has full range of motion.  But there is soft 

tissue swelling and he endorses drainage.  No fever or chills.


-: week(s)


Place: home


Patient Tetanus UTD: Yes


Context: accidental


Associated Symptoms: none





- Related Data


                                Home Medications











 Medication  Instructions  Recorded  Confirmed  Last Taken


 


Calcium Acetate 667 mg PO TID 02/20/14 05/10/18 05/09/18 22:00





    667 mg








                                  Previous Rx's











 Medication  Instructions  Recorded  Last Taken  Type


 


Clindamycin [Clindamycin CAP] 300 mg PO Q8H #30 cap 06/30/22 Unknown Rx











                                    Allergies











Allergy/AdvReac Type Severity Reaction Status Date / Time


 


cefazolin sodium [From Ancef] Allergy  Vomiting Verified 05/09/18 13:43














ED Review of Systems


ROS: 


Stated complaint: RT THUMB WOUND


Other details as noted in HPI





Comment: All other systems reviewed and negative





ED Past Medical Hx





- Past Medical History


Previous Medical History?: Yes


Hx Hypertension: Yes (per pt resolved)


Hx Renal Disease: Yes





- Surgical History


Past Surgical History?: Yes


Additional Surgical History: left arm AV graft, parathyroid removed





- Family History


Family history: no significant





- Social History


Smoking Status: Current Every Day Smoker


Substance Use Type: None





- Medications


Home Medications: 


                                Home Medications











 Medication  Instructions  Recorded  Confirmed  Last Taken  Type


 


Calcium Acetate 667 mg PO TID 02/20/14 05/10/18 05/09/18 22:00 History





    667 mg 


 


Clindamycin [Clindamycin CAP] 300 mg PO Q8H #30 cap 06/30/22  Unknown Rx














ED Physical Exam





- General


Limitations: No Limitations


General appearance: alert, in no apparent distress





- Head


Head exam: Present: atraumatic, normocephalic





- Eye


Eye exam: Present: normal appearance





- ENT


ENT exam: Present: mucous membranes moist





- Neck


Neck exam: Present: normal inspection





- Respiratory


Respiratory exam: Present: normal lung sounds bilaterally.  Absent: respiratory 

distress





- Cardiovascular


Cardiovascular Exam: Present: regular rate, normal rhythm.  Absent: systolic 

murmur, diastolic murmur, rubs, gallop





- GI/Abdominal


GI/Abdominal exam: Present: soft, normal bowel sounds





- Rectal


Rectal exam: Present: deferred





- Extremities Exam


Extremities exam: Present: normal inspection





- Back Exam


Back exam: Present: normal inspection





- Neurological Exam


Neurological exam: Present: alert, oriented X3





- Psychiatric


Psychiatric exam: Present: normal affect, normal mood





- Skin


Skin exam: Present: warm, dry, normal color, other.  Absent: rash





- Other


Other exam information: 





Right thumb with involves soft tissue, now 2 weeks old, skin healing with 

granulation.  Patient does report drainage.  Although there is none on exam.





ED Course


                                   Vital Signs











  06/29/22





  20:56


 


Temperature 98.2 F


 


Pulse Rate 88


 


Respiratory 18





Rate 


 


Blood Pressure 110/63


 


O2 Sat by Pulse 96





Oximetry 














ED Medical Decision Making





- Radiology Data


Radiology results: report reviewed, image reviewed





no fx





- Medical Decision Making








                                   Vital Signs











  06/29/22





  20:56


 


Temperature 98.2 F


 


Pulse Rate 88


 


Respiratory 18





Rate 


 


Blood Pressure 110/63


 


O2 Sat by Pulse 96





Oximetry 








Wound care provided and patient educated on wound care.





Patient is allergic to cephalosporins/penicillins.





Patient being discharged home with discharge plan of care including clindamycin,

diet, activity and follow-up.  He verbalizes understanding of plan of care





- Differential Diagnosis


2-week old wound, ro fx


Critical care attestation.: 


If time is entered above; I have spent that time in minutes in the direct care 

of this critically ill patient, excluding procedure time.








ED Disposition


Clinical Impression: 


 Wound infection





Disposition: 01 HOME / SELF CARE / HOMELESS


Is pt being admited?: No


Does the pt Need Aspirin: No


Condition: Stable


Instructions:  Cellulitis, Adult


Additional Instructions: 


keep wound clean and dry 





follow up with PCP on Monday to make sure this is getting better





med as ordered until gone





motrin or tylenol for pain


Prescriptions: 


Clindamycin [Clindamycin CAP] 300 mg PO Q8H #30 cap


Referrals: 


MAGY DUONG MD [Staff Physician] - 3-5 Days


Time of Disposition: 08:20

## 2022-08-08 ENCOUNTER — HOSPITAL ENCOUNTER (EMERGENCY)
Dept: HOSPITAL 5 - ED | Age: 47
Discharge: LEFT BEFORE DISCHARGE | End: 2022-08-08
Payer: MEDICARE

## 2022-08-08 VITALS — DIASTOLIC BLOOD PRESSURE: 71 MMHG | SYSTOLIC BLOOD PRESSURE: 103 MMHG

## 2022-08-08 DIAGNOSIS — Z53.21: ICD-10-CM

## 2022-08-08 DIAGNOSIS — Z00.00: Primary | ICD-10-CM
